# Patient Record
Sex: MALE | Race: WHITE | NOT HISPANIC OR LATINO | Employment: FULL TIME | URBAN - METROPOLITAN AREA
[De-identification: names, ages, dates, MRNs, and addresses within clinical notes are randomized per-mention and may not be internally consistent; named-entity substitution may affect disease eponyms.]

---

## 2024-05-12 DIAGNOSIS — E78.00 PURE HYPERCHOLESTEROLEMIA: ICD-10-CM

## 2024-05-12 DIAGNOSIS — I10 ESSENTIAL HYPERTENSION: ICD-10-CM

## 2024-05-14 DIAGNOSIS — Z79.01 CHRONIC ANTICOAGULATION: ICD-10-CM

## 2024-05-15 RX ORDER — ATORVASTATIN CALCIUM 40 MG/1
40 TABLET, FILM COATED ORAL
Qty: 90 TABLET | Refills: 0 | Status: SHIPPED | OUTPATIENT
Start: 2024-05-15

## 2024-06-04 DIAGNOSIS — Z79.01 CHRONIC ANTICOAGULATION: ICD-10-CM

## 2024-06-25 DIAGNOSIS — Z79.01 CHRONIC ANTICOAGULATION: ICD-10-CM

## 2024-06-25 RX ORDER — WARFARIN SODIUM 5 MG/1
TABLET ORAL
Qty: 135 TABLET | Refills: 0 | Status: SHIPPED | OUTPATIENT
Start: 2024-06-25

## 2024-08-15 DIAGNOSIS — I10 ESSENTIAL HYPERTENSION: ICD-10-CM

## 2024-08-15 RX ORDER — VERAPAMIL HYDROCHLORIDE 180 MG/1
360 TABLET, EXTENDED RELEASE ORAL DAILY
Qty: 180 TABLET | Refills: 0 | Status: SHIPPED | OUTPATIENT
Start: 2024-08-15

## 2024-08-15 NOTE — TELEPHONE ENCOUNTER
Received request via: Pharmacy    Was the patient seen in the last year in this department? Yes    Does the patient have an active prescription (recently filled or refills available) for medication(s) requested? No    [facility]    Does the patient have jail Plus and need 100-day supply? (This applies to ALL medications) Patient does not have SCP

## 2024-08-16 DIAGNOSIS — E78.00 PURE HYPERCHOLESTEROLEMIA: ICD-10-CM

## 2024-08-16 NOTE — TELEPHONE ENCOUNTER
Received request via: Patient    Was the patient seen in the last year in this department? Yes    Does the patient have an active prescription (recently filled or refills available) for medication(s) requested? No    [facility]    Does the patient have long-term Plus and need 100-day supply? (This applies to ALL medications) Patient does not have SCP

## 2024-08-17 RX ORDER — ATORVASTATIN CALCIUM 40 MG/1
40 TABLET, FILM COATED ORAL
Qty: 90 TABLET | Refills: 0 | Status: SHIPPED | OUTPATIENT
Start: 2024-08-17

## 2024-09-04 ENCOUNTER — TELEPHONE (OUTPATIENT)
Dept: HEALTH INFORMATION MANAGEMENT | Facility: OTHER | Age: 64
End: 2024-09-04

## 2024-09-22 DIAGNOSIS — Z79.01 CHRONIC ANTICOAGULATION: ICD-10-CM

## 2024-09-23 RX ORDER — WARFARIN SODIUM 5 MG/1
TABLET ORAL
Qty: 135 TABLET | Refills: 0 | Status: SHIPPED | OUTPATIENT
Start: 2024-09-23

## 2024-09-23 NOTE — TELEPHONE ENCOUNTER
Received request via: Pharmacy    Was the patient seen in the last year in this department? Yes    Does the patient have an active prescription (recently filled or refills available) for medication(s) requested? No        Does the patient have longterm Plus and need 100-day supply? (This applies to ALL medications) Patient does not have SCP

## 2024-10-31 ENCOUNTER — APPOINTMENT (OUTPATIENT)
Dept: MEDICAL GROUP | Facility: PHYSICIAN GROUP | Age: 64
End: 2024-10-31
Payer: COMMERCIAL

## 2024-10-31 VITALS
TEMPERATURE: 98 F | HEART RATE: 68 BPM | RESPIRATION RATE: 16 BRPM | SYSTOLIC BLOOD PRESSURE: 120 MMHG | OXYGEN SATURATION: 96 % | WEIGHT: 262.2 LBS | BODY MASS INDEX: 33.65 KG/M2 | HEIGHT: 74 IN | DIASTOLIC BLOOD PRESSURE: 72 MMHG

## 2024-10-31 DIAGNOSIS — Z85.46 HISTORY OF PROSTATE CANCER: ICD-10-CM

## 2024-10-31 DIAGNOSIS — R53.82 CHRONIC FATIGUE: ICD-10-CM

## 2024-10-31 DIAGNOSIS — K42.9 UMBILICAL HERNIA WITHOUT OBSTRUCTION AND WITHOUT GANGRENE: Chronic | ICD-10-CM

## 2024-10-31 DIAGNOSIS — E55.9 VITAMIN D DEFICIENCY DISEASE: Chronic | ICD-10-CM

## 2024-10-31 DIAGNOSIS — R73.03 PREDIABETES: Chronic | ICD-10-CM

## 2024-10-31 DIAGNOSIS — I82.5Y2 CHRONIC DEEP VEIN THROMBOSIS (DVT) OF PROXIMAL VEIN OF LEFT LOWER EXTREMITY (HCC): Chronic | ICD-10-CM

## 2024-10-31 DIAGNOSIS — I10 ESSENTIAL HYPERTENSION: Chronic | ICD-10-CM

## 2024-10-31 DIAGNOSIS — E78.5 DYSLIPIDEMIA: Chronic | ICD-10-CM

## 2024-10-31 DIAGNOSIS — Z12.5 SCREENING FOR MALIGNANT NEOPLASM OF PROSTATE: ICD-10-CM

## 2024-10-31 PROBLEM — M25.562 ACUTE PAIN OF LEFT KNEE: Status: RESOLVED | Noted: 2023-01-17 | Resolved: 2024-10-31

## 2024-10-31 PROBLEM — M79.89 LEFT LEG SWELLING: Status: RESOLVED | Noted: 2022-12-15 | Resolved: 2024-10-31

## 2024-10-31 PROBLEM — G89.29 CHRONIC PAIN OF LEFT ANKLE: Status: RESOLVED | Noted: 2022-03-07 | Resolved: 2024-10-31

## 2024-10-31 PROBLEM — Z87.19 HISTORY OF GASTROINTESTINAL BLEEDING: Status: RESOLVED | Noted: 2020-07-27 | Resolved: 2024-10-31

## 2024-10-31 PROBLEM — M25.572 CHRONIC PAIN OF LEFT ANKLE: Status: RESOLVED | Noted: 2022-03-07 | Resolved: 2024-10-31

## 2024-10-31 RX ORDER — VERAPAMIL HYDROCHLORIDE 180 MG/1
360 TABLET, EXTENDED RELEASE ORAL DAILY
Qty: 200 TABLET | Refills: 3 | Status: SHIPPED | OUTPATIENT
Start: 2024-10-31

## 2024-10-31 ASSESSMENT — FIBROSIS 4 INDEX: FIB4 SCORE: 1.36

## 2024-10-31 ASSESSMENT — ENCOUNTER SYMPTOMS: GENERAL WELL-BEING: FAIR

## 2024-10-31 ASSESSMENT — ACTIVITIES OF DAILY LIVING (ADL): BATHING_REQUIRES_ASSISTANCE: 0

## 2024-10-31 ASSESSMENT — PATIENT HEALTH QUESTIONNAIRE - PHQ9: CLINICAL INTERPRETATION OF PHQ2 SCORE: 0

## 2024-11-05 RX ORDER — AMLODIPINE BESYLATE 5 MG/1
5 TABLET ORAL DAILY
Qty: 90 TABLET | Refills: 3 | Status: SHIPPED | OUTPATIENT
Start: 2024-11-05

## 2024-11-09 ENCOUNTER — HOSPITAL ENCOUNTER (OUTPATIENT)
Dept: LAB | Facility: MEDICAL CENTER | Age: 64
End: 2024-11-09
Attending: INTERNAL MEDICINE
Payer: MEDICARE

## 2024-11-09 DIAGNOSIS — I82.5Y2 CHRONIC DEEP VEIN THROMBOSIS (DVT) OF PROXIMAL VEIN OF LEFT LOWER EXTREMITY (HCC): Chronic | ICD-10-CM

## 2024-11-09 DIAGNOSIS — E78.5 DYSLIPIDEMIA: Chronic | ICD-10-CM

## 2024-11-09 DIAGNOSIS — Z12.5 SCREENING FOR MALIGNANT NEOPLASM OF PROSTATE: ICD-10-CM

## 2024-11-09 DIAGNOSIS — R53.82 CHRONIC FATIGUE: ICD-10-CM

## 2024-11-09 DIAGNOSIS — E55.9 VITAMIN D DEFICIENCY DISEASE: Chronic | ICD-10-CM

## 2024-11-09 DIAGNOSIS — R73.03 PREDIABETES: Chronic | ICD-10-CM

## 2024-11-09 LAB
25(OH)D3 SERPL-MCNC: 34 NG/ML (ref 30–100)
ALT SERPL-CCNC: 51 U/L (ref 2–50)
ANION GAP SERPL CALC-SCNC: 11 MMOL/L (ref 7–16)
BASOPHILS # BLD AUTO: 1 % (ref 0–1.8)
BASOPHILS # BLD: 0.06 K/UL (ref 0–0.12)
BUN SERPL-MCNC: 16 MG/DL (ref 8–22)
CALCIUM SERPL-MCNC: 9.9 MG/DL (ref 8.5–10.5)
CHLORIDE SERPL-SCNC: 106 MMOL/L (ref 96–112)
CHOLEST SERPL-MCNC: 129 MG/DL (ref 100–199)
CO2 SERPL-SCNC: 24 MMOL/L (ref 20–33)
CREAT SERPL-MCNC: 0.78 MG/DL (ref 0.5–1.4)
CREAT UR-MCNC: 152.67 MG/DL
EOSINOPHIL # BLD AUTO: 0.15 K/UL (ref 0–0.51)
EOSINOPHIL NFR BLD: 2.5 % (ref 0–6.9)
ERYTHROCYTE [DISTWIDTH] IN BLOOD BY AUTOMATED COUNT: 45.4 FL (ref 35.9–50)
EST. AVERAGE GLUCOSE BLD GHB EST-MCNC: 117 MG/DL
FASTING STATUS PATIENT QL REPORTED: NORMAL
GFR SERPLBLD CREATININE-BSD FMLA CKD-EPI: 99 ML/MIN/1.73 M 2
GLUCOSE SERPL-MCNC: 86 MG/DL (ref 65–99)
HBA1C MFR BLD: 5.7 % (ref 4–5.6)
HCT VFR BLD AUTO: 47.1 % (ref 42–52)
HDLC SERPL-MCNC: 48 MG/DL
HGB BLD-MCNC: 16 G/DL (ref 14–18)
IMM GRANULOCYTES # BLD AUTO: 0.03 K/UL (ref 0–0.11)
IMM GRANULOCYTES NFR BLD AUTO: 0.5 % (ref 0–0.9)
LDLC SERPL CALC-MCNC: 62 MG/DL
LYMPHOCYTES # BLD AUTO: 1.38 K/UL (ref 1–4.8)
LYMPHOCYTES NFR BLD: 22.6 % (ref 22–41)
MCH RBC QN AUTO: 32.9 PG (ref 27–33)
MCHC RBC AUTO-ENTMCNC: 34 G/DL (ref 32.3–36.5)
MCV RBC AUTO: 96.9 FL (ref 81.4–97.8)
MICROALBUMIN UR-MCNC: 1.5 MG/DL
MICROALBUMIN/CREAT UR: 10 MG/G (ref 0–30)
MONOCYTES # BLD AUTO: 0.64 K/UL (ref 0–0.85)
MONOCYTES NFR BLD AUTO: 10.5 % (ref 0–13.4)
NEUTROPHILS # BLD AUTO: 3.85 K/UL (ref 1.82–7.42)
NEUTROPHILS NFR BLD: 62.9 % (ref 44–72)
NRBC # BLD AUTO: 0 K/UL
NRBC BLD-RTO: 0 /100 WBC (ref 0–0.2)
PLATELET # BLD AUTO: 224 K/UL (ref 164–446)
PMV BLD AUTO: 10.3 FL (ref 9–12.9)
POTASSIUM SERPL-SCNC: 4.6 MMOL/L (ref 3.6–5.5)
PSA SERPL DL<=0.01 NG/ML-MCNC: 0.45 NG/ML (ref 0–4)
RBC # BLD AUTO: 4.86 M/UL (ref 4.7–6.1)
SODIUM SERPL-SCNC: 141 MMOL/L (ref 135–145)
TRIGL SERPL-MCNC: 95 MG/DL (ref 0–149)
TSH SERPL-ACNC: 1.68 UIU/ML (ref 0.35–5.5)
WBC # BLD AUTO: 6.1 K/UL (ref 4.8–10.8)

## 2024-11-09 PROCEDURE — 84460 ALANINE AMINO (ALT) (SGPT): CPT

## 2024-11-09 PROCEDURE — 80048 BASIC METABOLIC PNL TOTAL CA: CPT

## 2024-11-09 PROCEDURE — 80061 LIPID PANEL: CPT

## 2024-11-09 PROCEDURE — 84153 ASSAY OF PSA TOTAL: CPT

## 2024-11-09 PROCEDURE — 36415 COLL VENOUS BLD VENIPUNCTURE: CPT

## 2024-11-09 PROCEDURE — 84443 ASSAY THYROID STIM HORMONE: CPT

## 2024-11-09 PROCEDURE — 83036 HEMOGLOBIN GLYCOSYLATED A1C: CPT

## 2024-11-09 PROCEDURE — 82306 VITAMIN D 25 HYDROXY: CPT

## 2024-11-09 PROCEDURE — 82043 UR ALBUMIN QUANTITATIVE: CPT

## 2024-11-09 PROCEDURE — 82570 ASSAY OF URINE CREATININE: CPT

## 2024-11-09 PROCEDURE — 85025 COMPLETE CBC W/AUTO DIFF WBC: CPT

## 2024-11-15 DIAGNOSIS — E78.00 PURE HYPERCHOLESTEROLEMIA: ICD-10-CM

## 2024-11-15 RX ORDER — ATORVASTATIN CALCIUM 40 MG/1
40 TABLET, FILM COATED ORAL
Qty: 100 TABLET | Refills: 1 | Status: SHIPPED | OUTPATIENT
Start: 2024-11-15

## 2024-11-15 NOTE — TELEPHONE ENCOUNTER
Pt has had OV within the 12 month protocol and lipid panel is current. 6 month supply sent to pharmacy.   Lab Results   Component Value Date/Time    CHOLSTRLTOT 129 11/09/2024 07:25 AM    LDL 62 11/09/2024 07:25 AM    HDL 48 11/09/2024 07:25 AM    TRIGLYCERIDE 95 11/09/2024 07:25 AM       Lab Results   Component Value Date/Time    SODIUM 141 11/09/2024 07:25 AM    POTASSIUM 4.6 11/09/2024 07:25 AM    CHLORIDE 106 11/09/2024 07:25 AM    CO2 24 11/09/2024 07:25 AM    GLUCOSE 86 11/09/2024 07:25 AM    BUN 16 11/09/2024 07:25 AM    CREATININE 0.78 11/09/2024 07:25 AM     Lab Results   Component Value Date/Time    ALKPHOSPHAT 146 (H) 11/15/2022 03:16 PM    ASTSGOT 28 11/15/2022 03:16 PM    ALTSGPT 51 (H) 11/09/2024 07:25 AM    TBILIRUBIN 0.6 11/15/2022 03:16 PM        Alem Nieves, Clinical Pharmacist, CDE, CACP  Managed Care Pharmacist for [facility] and [facility]

## 2024-11-25 ENCOUNTER — OFFICE VISIT (OUTPATIENT)
Dept: SURGICAL ONCOLOGY | Facility: MEDICAL CENTER | Age: 64
End: 2024-11-25
Payer: MEDICARE

## 2024-11-25 VITALS
OXYGEN SATURATION: 96 % | HEART RATE: 72 BPM | SYSTOLIC BLOOD PRESSURE: 142 MMHG | HEIGHT: 74 IN | WEIGHT: 269 LBS | TEMPERATURE: 97.3 F | DIASTOLIC BLOOD PRESSURE: 82 MMHG | BODY MASS INDEX: 34.52 KG/M2

## 2024-11-25 DIAGNOSIS — K42.9 UMBILICAL HERNIA WITHOUT OBSTRUCTION AND WITHOUT GANGRENE: ICD-10-CM

## 2024-11-25 PROCEDURE — 99213 OFFICE O/P EST LOW 20 MIN: CPT | Performed by: SURGERY

## 2024-11-25 PROCEDURE — 3077F SYST BP >= 140 MM HG: CPT | Performed by: SURGERY

## 2024-11-25 PROCEDURE — 3079F DIAST BP 80-89 MM HG: CPT | Performed by: SURGERY

## 2024-11-25 NOTE — PROGRESS NOTES
DEPARTMENT OF SURGERY  BARIATRIC AND GASTROESOPHAGEAL SURGERY       DATE OF CLINIC VISIT:  11/25/2024      CHIEF COMPLAINT:  Symptomatic umbilical bulge, associated with pain/discomfort    HISTORY OF PRESENT ILLNESS:  We had the pleasure of seeing FRANKLIN MALDONADO, a 64 y.o. male, for evaluation of a moderate umbilical / abdominal bulge, associated with pain/discomfort. This consultation was requested by Dr. Fisher. Patient reports that his symptoms are more prominent when lifting, coughing, performing strenuous activities, and getting up from standing. This bulge is reducible, getting larger, and is limiting exercise and some activities of daily living. No history of intestinal obstruction (bloating/distention, nausea/vomiting, worsening abdominal pain, or obstipation) or any episodes of this bulge being irreducible. Initial onset of symptoms started many years ago, but it has increased in size and became more painful in the last few years. No other abdominal or inguinal bulges and no other personal or family history of hernias. Pertinent imaging includes: NO Prior imaging has been done.     Of note, patient is on Xarelto for history of DVT's and strokes.    Allergies   Allergen Reactions    Enalapril Cough       Past Medical History:   Diagnosis Date    Blister of foot or toe, infected 9/23/2015    Cancer (HCC) 2011    prostate cancer    Heart burn     Hyperlipidemia     Hypertension     Personal history of venous thrombosis and embolism 2000, 2005    leg    Prostate cancer (HCC) 5/16/2013    Sees Dr Washington  Treated with external beam radiation IMO load March 2020    Snoring     Umbilicus discharge 9/23/2015    Unspecified hemorrhagic conditions     due to coumadin      Past Surgical History:   Procedure Laterality Date    [place] COLONOSCOPY,DIAGNOSTIC N/A 7/28/2020    Procedure: COLONOSCOPY;  Surgeon: Blayne Guzman M.D.;  Location: SURGERY SAME DAY [facility];  Service: Gastroenterology    [place] UPPER GI  ENDOSCOPY,BIOPSY N/A 7/28/2020    Procedure: GASTROSCOPY, WITH BIOPSY;  Surgeon: Blayne Guzman M.D.;  Location: SURGERY SAME DAY [facility];  Service: Gastroenterology    COLONOSCOPY WITH APC N/A 8/20/2018    Procedure: COLONOSCOPY WITH APC;  Surgeon: Maurilio Escoto M.D.;  Location: ENDOSCOPY [facility];  Service: Gastroenterology    COLONOSCOPY  3/2011    Mild diverticulosis;hemorrhoids   Dr Escoto    KNEE ARTHROSCOPY  10/25/2010    Right knee  Performed by RADHA RAJAN at SURGERY [facility]    MENISCECTOMY, KNEE, MEDIAL  10/25/2010    Right knee Performed by RADHA RAJAN at SURGERY [facility]    SYNOVECTOMY  10/25/2010    Right knee Performed by RADHA RAJAN at SURGERY [facility]    ORIF, ANKLE  2005    left ankle    ORIF, FRACTURE, FEMUR  2005    left femur     Family History   Problem Relation Age of Onset    Lung Disease Mother         emphysema    Cancer Father         lung    Lung Disease Father     Diabetes Other     Heart Disease Other     Diabetes Sister     No Known Problems Brother     No Known Problems Sister     No Known Problems Daughter     No Known Problems Daughter      Social History     Socioeconomic History    Marital status:      Spouse name: Not on file    Number of children: Not on file    Years of education: Not on file    Highest education level: Not on file   Occupational History    Not on file   Tobacco Use    Smoking status: Never    Smokeless tobacco: Never   Vaping Use    Vaping status: Never Used   Substance and Sexual Activity    Alcohol use: Yes     Alcohol/week: 1.2 oz     Types: 2 Cans of beer per week     Comment: drinks beer on weekends, 1-2 beers on the weekdays    Drug use: No    Sexual activity: Yes     Partners: Female   Other Topics Concern    Not on file   Social History Narrative    Not on file     Social Drivers of Health     Financial Resource Strain: Not on file   Food Insecurity: Not on file   Transportation Needs: Not  "on file   Physical Activity: Not on file   Stress: Not on file   Social Connections: Not on file   Intimate Partner Violence: Not on file   Housing Stability: Not on file       Current Outpatient Medications   Medication Sig Dispense Refill    atorvastatin (LIPITOR) 40 MG Tab Take 1 Tablet by mouth at bedtime. 100 Tablet 1    amLODIPine (NORVASC) 5 MG Tab Take 1 Tablet by mouth every day. 90 Tablet 3    rivaroxaban (XARELTO) 20 MG Tab tablet Take 1 Tablet by mouth with dinner. 30 Tablet 6    rivaroxaban (XARELTO) 15 MG Tab tablet Take 1 Tablet by mouth every 12 hours. 42 Tablet 0    vitamin D3 (CHOLECALCIFEROL) 1000 Unit (25 mcg) Tab Take 1 Tablet by mouth every day.      acetaminophen (TYLENOL) 500 MG Tab Take 2 Tablets by mouth every 6 hours as needed for Moderate Pain.      MAGNESIUM PO Take 1 Tablet by mouth every day.      therapeutic multivitamin-minerals (THERAGRAN-M) Tab Take 1 Tablet by mouth every day.       No current facility-administered medications for this visit.      REVIEW OF SYSTEMS:   Review of systems (+10 systems) unremarkable except for concerns noted by patient or items listed. Please see HPI for additional ROS.    PHYSICAL EXAM:  BP (!) 142/82 (BP Location: Left arm, Patient Position: Sitting)   Pulse 72   Temp 36.3 °C (97.3 °F) (Temporal)   Ht 1.88 m (6' 2\")   Wt 122 kg (269 lb)   SpO2 96%   BMI 34.54 kg/m²     Wt Readings from Last 1 Encounters:   11/25/24 122 kg (269 lb)     Constitutional:  well developed, well nourished, in no apparent distress, alert and oriented x 3  Eyes:   sclera is anicteric, PERRLA  Head:   normocephalic, atraumatic, no scalp lesions  Mouth/Throat:  normal, moist mucous membranes  Neck:   supple, no JVD, no tracheal deviation  Chest:   good respiratory effort, no accessory muscle use  Cardiac:  normal rate, regular rhythm   Abdomen:  + moderate, reducible umbilical bulge, with fascial defect measuring ~ 3 cm in diameter, + min tenderness to palpation, + min " overlying cutaneous erythema (no ulcerations), no associated distention, no rebound/guarding, no TTP elsewhere, no other bulges or masses   Groin:     R - no bulges/masses, no palpable cough impulse, no overlying skin changes, no TTP     L - no bulges/masses, no palpable cough impulse, no overlying skin changes, no TTP  Extremities:  normal strength, tone, and muscle mass; no clubbing, cyanosis, or edema  bilaterally  Neurologic:  normal without focal findings, cognition is intact, CN II - XII are grossly intact  Skin:   no jaundice, no rashes or significant lesions, no bruising  Psych:   good judgement, mood and affect are appropriate, excellent hygiene    DATA REVIEW:    None    ASSESSMENT AND PLAN:  Mr. FRANKLIN MALDONADO is a 64 y.o. male, who complains of a moderate-size, umbilical bulge, associated with pain/discomfort, and is found to have a reducible, umbilical / abdominal wall hernia (~ 3 cm) on clinical exam. I have discussed robotic, laparoscopic, and open umbilical / abdominal wall hernia repair procedures, possibly with synthetic mesh reinforcement.     We discussed the risks of repair and the risk it will enlarge, worsen, or incarcerate without repair. In addition, we discussed alternatives, risks and disability in the jacquelyn-operative setting. Specifically, discussed were the risks of bleeding, infection, pain, fistulization, bowel or other organ injury, complications due to mesh and chances of recurrence. Questions were answered in full and patient wishes to proceed. Appropriate educational material was presented to the patient. Robotic, possible laparoscopic, umbilical hernia repair with possible mesh placement will be scheduled at his earliest convenience.     Of note, patient was instructed to hold his Xarelto for 24 hours prior to his abdominal surgery.    Thank you for giving us the opportunity to care for your patient.      Sincerely,    Pineda Fox MD MPH FACS [facility]  Bariatric and  Gastroesophageal Surgery  Department of Surgery, [facility]  Clinical Assistant Professor of Surgery  [employer]    11/25/2024  +3

## 2024-12-03 ENCOUNTER — APPOINTMENT (OUTPATIENT)
Dept: ADMISSIONS | Facility: MEDICAL CENTER | Age: 64
End: 2024-12-03
Attending: SURGERY
Payer: MEDICARE

## 2024-12-04 ENCOUNTER — DOCUMENTATION (OUTPATIENT)
Dept: HEALTH INFORMATION MANAGEMENT | Facility: OTHER | Age: 64
End: 2024-12-04
Payer: MEDICARE

## 2024-12-09 ENCOUNTER — PRE-ADMISSION TESTING (OUTPATIENT)
Dept: ADMISSIONS | Facility: MEDICAL CENTER | Age: 64
End: 2024-12-09
Attending: SURGERY
Payer: MEDICARE

## 2024-12-09 NOTE — PREADMIT AVS NOTE
Current Medications   Medication Instructions    atorvastatin (LIPITOR) 40 MG Tab Continue taking medication prior to surgery    amLODIPine (NORVASC) 5 MG Tab Continue taking medication as prescribed, including morning of procedure     rivaroxaban (XARELTO) 20 MG Tab tablet Follow instructions from Surgeon or Specialist     rivaroxaban (XARELTO) 15 MG Tab tablet NOT TAKING    vitamin D3 (CHOLECALCIFEROL) 1000 Unit (25 mcg) Tab Stop 7 days before surgery    acetaminophen (TYLENOL) 500 MG Tab As needed medication, may take if needed, including morning of procedure     MAGNESIUM PO Stop 7 days before surgery    therapeutic multivitamin-minerals (THERAGRAN-M) Tab Stop 7 days before surgery

## 2024-12-16 ENCOUNTER — PRE-ADMISSION TESTING (OUTPATIENT)
Dept: ADMISSIONS | Facility: MEDICAL CENTER | Age: 64
End: 2024-12-16
Attending: SURGERY
Payer: MEDICARE

## 2024-12-16 DIAGNOSIS — Z01.810 PRE-OPERATIVE CARDIOVASCULAR EXAMINATION: ICD-10-CM

## 2024-12-16 LAB — EKG IMPRESSION: NORMAL

## 2024-12-16 PROCEDURE — 93010 ELECTROCARDIOGRAM REPORT: CPT | Performed by: INTERNAL MEDICINE

## 2024-12-16 PROCEDURE — 93005 ELECTROCARDIOGRAM TRACING: CPT | Mod: TC

## 2024-12-27 ENCOUNTER — ANESTHESIA (OUTPATIENT)
Dept: SURGERY | Facility: MEDICAL CENTER | Age: 64
End: 2024-12-27
Payer: MEDICARE

## 2024-12-27 ENCOUNTER — ANESTHESIA EVENT (OUTPATIENT)
Dept: SURGERY | Facility: MEDICAL CENTER | Age: 64
End: 2024-12-27
Payer: MEDICARE

## 2024-12-27 ENCOUNTER — PHARMACY VISIT (OUTPATIENT)
Dept: PHARMACY | Facility: MEDICAL CENTER | Age: 64
End: 2024-12-27
Payer: COMMERCIAL

## 2024-12-27 ENCOUNTER — HOSPITAL ENCOUNTER (OUTPATIENT)
Facility: MEDICAL CENTER | Age: 64
End: 2024-12-27
Attending: SURGERY | Admitting: SURGERY
Payer: MEDICARE

## 2024-12-27 VITALS
DIASTOLIC BLOOD PRESSURE: 58 MMHG | WEIGHT: 264.11 LBS | HEART RATE: 86 BPM | TEMPERATURE: 96.6 F | RESPIRATION RATE: 17 BRPM | HEIGHT: 72 IN | BODY MASS INDEX: 35.77 KG/M2 | OXYGEN SATURATION: 92 % | SYSTOLIC BLOOD PRESSURE: 121 MMHG

## 2024-12-27 DIAGNOSIS — R10.33 UMBILICAL PAIN: ICD-10-CM

## 2024-12-27 PROCEDURE — 700102 HCHG RX REV CODE 250 W/ 637 OVERRIDE(OP): Performed by: ANESTHESIOLOGY

## 2024-12-27 PROCEDURE — 160031 HCHG SURGERY MINUTES - 1ST 30 MINS LEVEL 5: Performed by: SURGERY

## 2024-12-27 PROCEDURE — 160002 HCHG RECOVERY MINUTES (STAT): Performed by: SURGERY

## 2024-12-27 PROCEDURE — 160046 HCHG PACU - 1ST 60 MINS PHASE II: Performed by: SURGERY

## 2024-12-27 PROCEDURE — 160025 RECOVERY II MINUTES (STATS): Performed by: SURGERY

## 2024-12-27 PROCEDURE — 700111 HCHG RX REV CODE 636 W/ 250 OVERRIDE (IP): Performed by: ANESTHESIOLOGY

## 2024-12-27 PROCEDURE — 700111 HCHG RX REV CODE 636 W/ 250 OVERRIDE (IP): Performed by: SURGERY

## 2024-12-27 PROCEDURE — C1781 MESH (IMPLANTABLE): HCPCS | Performed by: SURGERY

## 2024-12-27 PROCEDURE — 49593 RPR AA HRN 1ST 3-10 RDC: CPT | Performed by: SURGERY

## 2024-12-27 PROCEDURE — RXMED WILLOW AMBULATORY MEDICATION CHARGE: Performed by: SURGERY

## 2024-12-27 PROCEDURE — 160035 HCHG PACU - 1ST 60 MINS PHASE I: Performed by: SURGERY

## 2024-12-27 PROCEDURE — 160048 HCHG OR STATISTICAL LEVEL 1-5: Performed by: SURGERY

## 2024-12-27 PROCEDURE — 700105 HCHG RX REV CODE 258: Performed by: SURGERY

## 2024-12-27 PROCEDURE — 700101 HCHG RX REV CODE 250: Performed by: SURGERY

## 2024-12-27 PROCEDURE — A9270 NON-COVERED ITEM OR SERVICE: HCPCS | Performed by: ANESTHESIOLOGY

## 2024-12-27 PROCEDURE — S2900 ROBOTIC SURGICAL SYSTEM: HCPCS | Performed by: SURGERY

## 2024-12-27 PROCEDURE — 160047 HCHG PACU  - EA ADDL 30 MINS PHASE II: Performed by: SURGERY

## 2024-12-27 PROCEDURE — 160036 HCHG PACU - EA ADDL 30 MINS PHASE I: Performed by: SURGERY

## 2024-12-27 PROCEDURE — 160009 HCHG ANES TIME/MIN: Performed by: SURGERY

## 2024-12-27 PROCEDURE — 502714 HCHG ROBOTIC SURGERY SERVICES: Performed by: SURGERY

## 2024-12-27 PROCEDURE — 700101 HCHG RX REV CODE 250: Performed by: ANESTHESIOLOGY

## 2024-12-27 PROCEDURE — 160042 HCHG SURGERY MINUTES - EA ADDL 1 MIN LEVEL 5: Performed by: SURGERY

## 2024-12-27 DEVICE — IMPLANTABLE DEVICE: Type: IMPLANTABLE DEVICE | Site: ABDOMEN | Status: FUNCTIONAL

## 2024-12-27 RX ORDER — DIPHENHYDRAMINE HYDROCHLORIDE 50 MG/ML
12.5 INJECTION INTRAMUSCULAR; INTRAVENOUS
Status: DISCONTINUED | OUTPATIENT
Start: 2024-12-27 | End: 2024-12-27 | Stop reason: HOSPADM

## 2024-12-27 RX ORDER — METHOCARBAMOL 500 MG/1
1000 TABLET, FILM COATED ORAL 3 TIMES DAILY PRN
Qty: 180 TABLET | Refills: 1 | Status: SHIPPED | OUTPATIENT
Start: 2024-12-27

## 2024-12-27 RX ORDER — MEPERIDINE HYDROCHLORIDE 25 MG/ML
6.25 INJECTION INTRAMUSCULAR; INTRAVENOUS; SUBCUTANEOUS
Status: DISCONTINUED | OUTPATIENT
Start: 2024-12-27 | End: 2024-12-27 | Stop reason: HOSPADM

## 2024-12-27 RX ORDER — HYDRALAZINE HYDROCHLORIDE 20 MG/ML
5 INJECTION INTRAMUSCULAR; INTRAVENOUS
Status: DISCONTINUED | OUTPATIENT
Start: 2024-12-27 | End: 2024-12-27 | Stop reason: HOSPADM

## 2024-12-27 RX ORDER — BUPIVACAINE HYDROCHLORIDE AND EPINEPHRINE 5; 5 MG/ML; UG/ML
INJECTION, SOLUTION PERINEURAL
Status: DISCONTINUED | OUTPATIENT
Start: 2024-12-27 | End: 2024-12-27 | Stop reason: HOSPADM

## 2024-12-27 RX ORDER — SODIUM CHLORIDE, SODIUM LACTATE, POTASSIUM CHLORIDE, CALCIUM CHLORIDE 600; 310; 30; 20 MG/100ML; MG/100ML; MG/100ML; MG/100ML
INJECTION, SOLUTION INTRAVENOUS CONTINUOUS
Status: ACTIVE | OUTPATIENT
Start: 2024-12-27 | End: 2024-12-27

## 2024-12-27 RX ORDER — ALBUTEROL SULFATE 5 MG/ML
2.5 SOLUTION RESPIRATORY (INHALATION)
Status: DISCONTINUED | OUTPATIENT
Start: 2024-12-27 | End: 2024-12-27 | Stop reason: HOSPADM

## 2024-12-27 RX ORDER — OXYCODONE HCL 5 MG/5 ML
5 SOLUTION, ORAL ORAL
Status: COMPLETED | OUTPATIENT
Start: 2024-12-27 | End: 2024-12-27

## 2024-12-27 RX ORDER — LIDOCAINE HYDROCHLORIDE 20 MG/ML
INJECTION, SOLUTION EPIDURAL; INFILTRATION; INTRACAUDAL; PERINEURAL PRN
Status: DISCONTINUED | OUTPATIENT
Start: 2024-12-27 | End: 2024-12-27 | Stop reason: SURG

## 2024-12-27 RX ORDER — HEPARIN SODIUM 5000 [USP'U]/ML
5000 INJECTION, SOLUTION INTRAVENOUS; SUBCUTANEOUS
Status: COMPLETED | OUTPATIENT
Start: 2024-12-27 | End: 2024-12-27

## 2024-12-27 RX ORDER — HYDROMORPHONE HYDROCHLORIDE 1 MG/ML
0.4 INJECTION, SOLUTION INTRAMUSCULAR; INTRAVENOUS; SUBCUTANEOUS
Status: DISCONTINUED | OUTPATIENT
Start: 2024-12-27 | End: 2024-12-27 | Stop reason: HOSPADM

## 2024-12-27 RX ORDER — OXYCODONE AND ACETAMINOPHEN 5; 325 MG/1; MG/1
1 TABLET ORAL EVERY 4 HOURS PRN
Qty: 40 TABLET | Refills: 0 | Status: SHIPPED | OUTPATIENT
Start: 2024-12-27 | End: 2025-01-03

## 2024-12-27 RX ORDER — SODIUM CHLORIDE, SODIUM LACTATE, POTASSIUM CHLORIDE, CALCIUM CHLORIDE 600; 310; 30; 20 MG/100ML; MG/100ML; MG/100ML; MG/100ML
INJECTION, SOLUTION INTRAVENOUS CONTINUOUS
Status: DISCONTINUED | OUTPATIENT
Start: 2024-12-27 | End: 2024-12-27 | Stop reason: HOSPADM

## 2024-12-27 RX ORDER — POLYETHYLENE GLYCOL 3350 17 G/17G
17 POWDER, FOR SOLUTION ORAL
Qty: 14 EACH | Refills: 0 | Status: SHIPPED | OUTPATIENT
Start: 2024-12-27 | End: 2025-01-10

## 2024-12-27 RX ORDER — CEFAZOLIN SODIUM 1 G/3ML
INJECTION, POWDER, FOR SOLUTION INTRAMUSCULAR; INTRAVENOUS PRN
Status: DISCONTINUED | OUTPATIENT
Start: 2024-12-27 | End: 2024-12-27 | Stop reason: SURG

## 2024-12-27 RX ORDER — NAPROXEN 500 MG/1
500 TABLET ORAL
Qty: 60 TABLET | Refills: 1 | Status: SHIPPED | OUTPATIENT
Start: 2024-12-27 | End: 2025-01-26

## 2024-12-27 RX ORDER — OXYCODONE HCL 5 MG/5 ML
10 SOLUTION, ORAL ORAL
Status: DISCONTINUED | OUTPATIENT
Start: 2024-12-27 | End: 2024-12-27 | Stop reason: HOSPADM

## 2024-12-27 RX ORDER — METOPROLOL TARTRATE 1 MG/ML
1 INJECTION, SOLUTION INTRAVENOUS
Status: DISCONTINUED | OUTPATIENT
Start: 2024-12-27 | End: 2024-12-27 | Stop reason: HOSPADM

## 2024-12-27 RX ORDER — EPHEDRINE SULFATE 50 MG/ML
5 INJECTION, SOLUTION INTRAVENOUS
Status: DISCONTINUED | OUTPATIENT
Start: 2024-12-27 | End: 2024-12-27 | Stop reason: HOSPADM

## 2024-12-27 RX ORDER — DEXAMETHASONE SODIUM PHOSPHATE 4 MG/ML
INJECTION, SOLUTION INTRA-ARTICULAR; INTRALESIONAL; INTRAMUSCULAR; INTRAVENOUS; SOFT TISSUE PRN
Status: DISCONTINUED | OUTPATIENT
Start: 2024-12-27 | End: 2024-12-27 | Stop reason: SURG

## 2024-12-27 RX ORDER — MIDAZOLAM HYDROCHLORIDE 1 MG/ML
INJECTION INTRAMUSCULAR; INTRAVENOUS PRN
Status: DISCONTINUED | OUTPATIENT
Start: 2024-12-27 | End: 2024-12-27 | Stop reason: SURG

## 2024-12-27 RX ORDER — METHOCARBAMOL 100 MG/ML
1000 INJECTION, SOLUTION INTRAMUSCULAR; INTRAVENOUS ONCE
Status: COMPLETED | OUTPATIENT
Start: 2024-12-27 | End: 2024-12-27

## 2024-12-27 RX ORDER — HYDROMORPHONE HYDROCHLORIDE 1 MG/ML
0.1 INJECTION, SOLUTION INTRAMUSCULAR; INTRAVENOUS; SUBCUTANEOUS
Status: DISCONTINUED | OUTPATIENT
Start: 2024-12-27 | End: 2024-12-27 | Stop reason: HOSPADM

## 2024-12-27 RX ORDER — KETOROLAC TROMETHAMINE 15 MG/ML
15 INJECTION, SOLUTION INTRAMUSCULAR; INTRAVENOUS ONCE
Status: COMPLETED | OUTPATIENT
Start: 2024-12-27 | End: 2024-12-27

## 2024-12-27 RX ORDER — HYDRALAZINE HYDROCHLORIDE 20 MG/ML
INJECTION INTRAMUSCULAR; INTRAVENOUS PRN
Status: DISCONTINUED | OUTPATIENT
Start: 2024-12-27 | End: 2024-12-27 | Stop reason: SURG

## 2024-12-27 RX ORDER — ROCURONIUM BROMIDE 10 MG/ML
INJECTION, SOLUTION INTRAVENOUS PRN
Status: DISCONTINUED | OUTPATIENT
Start: 2024-12-27 | End: 2024-12-27 | Stop reason: SURG

## 2024-12-27 RX ORDER — HYDROMORPHONE HYDROCHLORIDE 1 MG/ML
0.2 INJECTION, SOLUTION INTRAMUSCULAR; INTRAVENOUS; SUBCUTANEOUS
Status: DISCONTINUED | OUTPATIENT
Start: 2024-12-27 | End: 2024-12-27 | Stop reason: HOSPADM

## 2024-12-27 RX ORDER — OXYCODONE HCL 5 MG/5 ML
5 SOLUTION, ORAL ORAL
Status: DISCONTINUED | OUTPATIENT
Start: 2024-12-27 | End: 2024-12-27 | Stop reason: HOSPADM

## 2024-12-27 RX ORDER — METHOCARBAMOL 500 MG/1
1000 TABLET, FILM COATED ORAL 3 TIMES DAILY PRN
Qty: 90 TABLET | Refills: 1 | Status: SHIPPED | OUTPATIENT
Start: 2024-12-27

## 2024-12-27 RX ORDER — ONDANSETRON 2 MG/ML
4 INJECTION INTRAMUSCULAR; INTRAVENOUS
Status: DISCONTINUED | OUTPATIENT
Start: 2024-12-27 | End: 2024-12-27 | Stop reason: HOSPADM

## 2024-12-27 RX ORDER — HALOPERIDOL 5 MG/ML
1 INJECTION INTRAMUSCULAR
Status: DISCONTINUED | OUTPATIENT
Start: 2024-12-27 | End: 2024-12-27 | Stop reason: HOSPADM

## 2024-12-27 RX ORDER — ACETAMINOPHEN 500 MG
1000 TABLET ORAL ONCE
Status: COMPLETED | OUTPATIENT
Start: 2024-12-27 | End: 2024-12-27

## 2024-12-27 RX ORDER — OXYCODONE HCL 5 MG/5 ML
10 SOLUTION, ORAL ORAL
Status: COMPLETED | OUTPATIENT
Start: 2024-12-27 | End: 2024-12-27

## 2024-12-27 RX ADMIN — KETOROLAC TROMETHAMINE 15 MG: 15 INJECTION, SOLUTION INTRAMUSCULAR; INTRAVENOUS at 12:57

## 2024-12-27 RX ADMIN — SUGAMMADEX 200 MG: 100 INJECTION, SOLUTION INTRAVENOUS at 12:24

## 2024-12-27 RX ADMIN — LIDOCAINE HYDROCHLORIDE 50 MG: 20 INJECTION, SOLUTION EPIDURAL; INFILTRATION; INTRACAUDAL; PERINEURAL at 11:54

## 2024-12-27 RX ADMIN — ROCURONIUM BROMIDE 12 MG: 50 INJECTION, SOLUTION INTRAVENOUS at 11:35

## 2024-12-27 RX ADMIN — SODIUM CHLORIDE, SODIUM LACTATE, POTASSIUM CHLORIDE, AND CALCIUM CHLORIDE: .6; .31; .03; .02 INJECTION, SOLUTION INTRAVENOUS at 09:10

## 2024-12-27 RX ADMIN — ROCURONIUM BROMIDE 10 MG: 50 INJECTION, SOLUTION INTRAVENOUS at 11:54

## 2024-12-27 RX ADMIN — FENTANYL CITRATE 28 MCG: 50 INJECTION, SOLUTION INTRAMUSCULAR; INTRAVENOUS at 12:32

## 2024-12-27 RX ADMIN — OXYCODONE HYDROCHLORIDE 10 MG: 5 SOLUTION ORAL at 12:38

## 2024-12-27 RX ADMIN — FENTANYL CITRATE 50 MCG: 50 INJECTION, SOLUTION INTRAMUSCULAR; INTRAVENOUS at 12:37

## 2024-12-27 RX ADMIN — CEFAZOLIN 3 G: 1 INJECTION, POWDER, FOR SOLUTION INTRAMUSCULAR; INTRAVENOUS at 11:05

## 2024-12-27 RX ADMIN — FENTANYL CITRATE 50 MCG: 50 INJECTION, SOLUTION INTRAMUSCULAR; INTRAVENOUS at 11:35

## 2024-12-27 RX ADMIN — HYDROMORPHONE HYDROCHLORIDE 0.4 MG: 1 INJECTION, SOLUTION INTRAMUSCULAR; INTRAVENOUS; SUBCUTANEOUS at 13:09

## 2024-12-27 RX ADMIN — PROPOFOL 200 MG: 10 INJECTION, EMULSION INTRAVENOUS at 10:58

## 2024-12-27 RX ADMIN — DEXAMETHASONE SODIUM PHOSPHATE 8 MG: 4 INJECTION INTRA-ARTICULAR; INTRALESIONAL; INTRAMUSCULAR; INTRAVENOUS; SOFT TISSUE at 11:37

## 2024-12-27 RX ADMIN — FENTANYL CITRATE 50 MCG: 50 INJECTION, SOLUTION INTRAMUSCULAR; INTRAVENOUS at 11:12

## 2024-12-27 RX ADMIN — FENTANYL CITRATE 75 MCG: 50 INJECTION, SOLUTION INTRAMUSCULAR; INTRAVENOUS at 11:21

## 2024-12-27 RX ADMIN — HYDROMORPHONE HYDROCHLORIDE 0.2 MG: 1 INJECTION, SOLUTION INTRAMUSCULAR; INTRAVENOUS; SUBCUTANEOUS at 13:29

## 2024-12-27 RX ADMIN — FENTANYL CITRATE 50 MCG: 50 INJECTION, SOLUTION INTRAMUSCULAR; INTRAVENOUS at 11:52

## 2024-12-27 RX ADMIN — HYDRALAZINE HYDROCHLORIDE 5 MG: 20 INJECTION, SOLUTION INTRAMUSCULAR; INTRAVENOUS at 12:01

## 2024-12-27 RX ADMIN — HYDRALAZINE HYDROCHLORIDE 5 MG: 20 INJECTION, SOLUTION INTRAMUSCULAR; INTRAVENOUS at 11:34

## 2024-12-27 RX ADMIN — LIDOCAINE HYDROCHLORIDE 50 MG: 20 INJECTION, SOLUTION EPIDURAL; INFILTRATION; INTRACAUDAL; PERINEURAL at 10:58

## 2024-12-27 RX ADMIN — HYDROMORPHONE HYDROCHLORIDE 0.4 MG: 1 INJECTION, SOLUTION INTRAMUSCULAR; INTRAVENOUS; SUBCUTANEOUS at 13:23

## 2024-12-27 RX ADMIN — ROCURONIUM BROMIDE 63 MG: 50 INJECTION, SOLUTION INTRAVENOUS at 10:58

## 2024-12-27 RX ADMIN — FENTANYL CITRATE 72 MCG: 50 INJECTION, SOLUTION INTRAMUSCULAR; INTRAVENOUS at 12:30

## 2024-12-27 RX ADMIN — FENTANYL CITRATE 25 MCG: 50 INJECTION, SOLUTION INTRAMUSCULAR; INTRAVENOUS at 12:09

## 2024-12-27 RX ADMIN — METHOCARBAMOL 1000 MG: 100 INJECTION, SOLUTION INTRAMUSCULAR; INTRAVENOUS at 12:45

## 2024-12-27 RX ADMIN — ACETAMINOPHEN 1000 MG: 500 TABLET ORAL at 10:47

## 2024-12-27 RX ADMIN — HEPARIN SODIUM 5000 UNITS: 5000 INJECTION, SOLUTION INTRAVENOUS; SUBCUTANEOUS at 09:12

## 2024-12-27 RX ADMIN — FENTANYL CITRATE 50 MCG: 50 INJECTION, SOLUTION INTRAMUSCULAR; INTRAVENOUS at 12:45

## 2024-12-27 RX ADMIN — MIDAZOLAM HYDROCHLORIDE 2 MG: 1 INJECTION, SOLUTION INTRAMUSCULAR; INTRAVENOUS at 10:55

## 2024-12-27 ASSESSMENT — PAIN DESCRIPTION - PAIN TYPE
TYPE: SURGICAL PAIN

## 2024-12-27 NOTE — DISCHARGE INSTRUCTIONS
HOME CARE INSTRUCTIONS    ACTIVITY: Rest and take it easy for the first 24 hours.  A responsible adult is recommended to remain with you during that time.  It is normal to feel sleepy.  We encourage you to not do anything that requires balance, judgment or coordination.    FOR 24 HOURS DO NOT:  Drive, operate machinery or run household appliances.  Drink beer or alcoholic beverages.  Make important decisions or sign legal documents.    SPECIAL INSTRUCTIONS: Do not lift anything that is heavier than 10 lb (4.5 kg), or the limit that you are told, until your health care provider says that it is safe.    DIET: To avoid nausea, slowly advance diet as tolerated, avoiding spicy or greasy foods for the first day.  Add more substantial food to your diet according to your physician's instructions.  Babies can be fed formula or breast milk as soon as they are hungry.  INCREASE FLUIDS AND FIBER TO AVOID CONSTIPATION.    SURGICAL DRESSING/BATHING: Do not pick or remove surgical glue. This will fall on its own. Keep area clean and dry. Do not scrub. No baths, hot tubs, or swimming until cleared by your physician.     MEDICATIONS: Resume taking daily medication.  Take prescribed pain medication with food.  If no medication is prescribed, you may take non-aspirin pain medication if needed.  PAIN MEDICATION CAN BE VERY CONSTIPATING.  Take a stool softener or laxative such as senokot, pericolace, or milk of magnesia if needed.    Prescription given for oxyCODONE-acetaminophen (PERCOCET) 5-325 MG Tab       methocarbamol 500 MG  naproxen 500 MG   polyethylene glycol/lytes Pack.    Last pain medication given: Oxycodone (Roxicodone) 10 mg at 12:38PM, Acetaminophen (Tylenol) 1000mg given at 10:47 AM  Methocarbamol (Robaxin) 1000mg IV given at 1245    A follow-up appointment should be arranged with your doctor in  [phone] ; call to schedule.    You should CALL YOUR PHYSICIAN if you develop:  Fever greater than 101 degrees F.  Pain  not relieved by medication, or persistent nausea or vomiting.  Excessive bleeding (blood soaking through dressing) or unexpected drainage from the wound.  Extreme redness or swelling around the incision site, drainage of pus or foul smelling drainage.  Inability to urinate or empty your bladder within 8 hours.  Problems with breathing or chest pain.    You should call 911 if you develop problems with breathing or chest pain.  If you are unable to contact your doctor or surgical center, you should go to the nearest emergency room or urgent care center.  Physician's telephone #:  [phone]     MILD FLU-LIKE SYMPTOMS ARE NORMAL.  YOU MAY EXPERIENCE GENERALIZED MUSCLE ACHES, THROAT IRRITATION, HEADACHE AND/OR SOME NAUSEA.    If any questions arise, call your doctor.  If your doctor is not available, please feel free to call the Surgical Center at [phone].  The Center is open Monday through Friday from 7AM to 7PM.      A registered nurse may call you a few days after your surgery to see how you are doing after your procedure.    You may also receive a survey in the mail within the next two weeks and we ask that you take a few moments to complete the survey and return it to us.  Our goal is to provide you with very good care and we value your comments.     Depression / Suicide Risk    As you are discharged from this [facility] facility, it is important to learn how to keep safe from harming yourself.    Recognize the warning signs:  Abrupt changes in personality, positive or negative- including increase in energy   Giving away possessions  Change in eating patterns- significant weight changes-  positive or negative  Change in sleeping patterns- unable to sleep or sleeping all the time   Unwillingness or inability to communicate  Depression  Unusual sadness, discouragement and loneliness  Talk of wanting to die  Neglect of personal appearance   Rebelliousness- reckless behavior  Withdrawal from  people/activities they love  Confusion- inability to concentrate     If you or a loved one observes any of these behaviors or has concerns about self-harm, here's what you can do:  Talk about it- your feelings and reasons for harming yourself  Remove any means that you might use to hurt yourself (examples: pills, rope, extension cords, firearm)  Get professional help from the community (Mental Health, Substance Abuse, psychological counseling)  Do not be alone:Call your Safe Contact- someone whom you trust who will be there for you.  Call your local CRISIS HOTLINE 940-2472 or 204-920-2662  Call your local Children's Mobile Crisis Response Team [place] (079) 852-1834 or www.[employer]  Call the toll free National Suicide Prevention Hotlines   National Suicide Prevention Lifeline 056-674-NZCY (9873)  National Hope Line Network 800-SUICIDE (243-9337)    I acknowledge receipt and understanding of these Home Care instructions.

## 2024-12-27 NOTE — OP REPORT
DEPARTMENT OF SURGERY    OPERATIVE REPORT        DATE OF OPERATION: 12/27/2024      SURGEON: Pineda Fox MD MPH FACS [facility]      ASSISTANT(S): None     ANESTHESIOLOGIST(S): Binu Spence MD       ANESTHESIA: General with local anesthetic      PREOPERATIVE DIAGNOSIS: Symptomatic umbilical hernia      POSTOPERATIVE DIAGNOSIS: Symptomatic umbilical hernia (4 cm)      OPERATION(S) PERFORMED: Robotic umbilical hernia repair with mesh (IPOM)      ESTIMATED BLOOD LOSS: 20 ml      SPECIMEN(S): None      COMPLICATIONS: None      BACKGROUND: The patient is a 64 y.o. male with a symptomatic umbilical hernia, associated with a bulge and pain. Mr. Sujit Sanchez is scheduled to undergo a robotic vs laparoscopic, possible open, umbilical hernia repair with mesh. The procedure and its associated risks, benefits, and alternatives were discussed with the patient. All his questions were answered and he agreed to proceed with the operation.      OPERATIVE FINDINGS: During this procedure, a small umbilical hernia was identified, measuring ~ 4 cm in diameter and containing a substantial amount of incarcerated omental fat. There was no evidence of any other anterior abdominal wall hernias elsewhere. Robotic IPOM repair was performed in a standard fashion, with mesh reinforcement.     OPERATIVE PROCEDURE: The patient was brought into the operating room (OR) and positioned on the operating table in the supine position. Care was taken to make sure that all the extremities and bony prominences were adequately padded. Intermittent compression devices were placed on bilateral lower extremities. The patient was given Ancef 3 g IV prior to induction of anesthesia. After successful initiation of general endotracheal anesthesia, patient’s abdominal wall was shaved, and then prepped and draped in the usual sterile fashion. A time out was completed verifying correct patient, procedure, site, positioning, and implant prior  to beginning of the operation.      A small skin incision was created in the left upper quadrant (in the Rios's point) and a Veress needle was inserted without incident. The saline drip test was performed and was negative. The pneumoperitoneum was created with CO2 to 15 mmHg pressure without any physiologic derangements. The abdomen was then entered by using the [employer] First Entry 5-mm trocar and a 5-mm, 0-degree scope in the same location, without incident. No injuries were noted from initial or subsequent trocar placement. Three 8-mm robotic trocars were placed laterally to the rectus muscle along the patient's left lateral abdomen. Diagnostic laparoscopy demonstrated a small umbilical hernia, measuring ~ 4 cm in diameter and containing a substantial amount of incarcerated pre-peritoneal fat. There was no evidence of any other anterior abdominal wall hernias elsewhere. The [employer] robotic system was then successfully docked to the patient's left side and the remainder of this operation was performed with assistance of the robot.     The umbilical hernia defect was identified and its contents were reduced with gentle traction after all adhesions to the hernia sac were taken down with robotic monopolar scissors. The defect was closed with a running 0-PDS (Stratafix) suture in two layers. It was then reinforced with a piece of synthetic coated intraperitoneal mesh (12 x 12 cm, by [employer]). It was rolled longitudinally into a compact cylinder, and then passed through the trocar. The mesh was unrolled into place to completely cover the defect and provide adequate overlap on each side. Next, it was secured into position by using four running 3-0 PDS (Stratafix) sutures around its periphery.       After ensuring adequate hemostasis using electrocautery, the insufflation was stopped and the robotic system was undocked. All trocars were removed and their skin incisions were closed using 4-0 Monocryl sutures.  Dermabond was applied over each incision. The patient was then awakened from anesthesia and transferred to the recovery room in stable condition. At the conclusion of the case, the sponge, needle, and instrument counts were correct x 2.      Dr. Fox was present and scrubbed throughout the entirety of this case.

## 2024-12-27 NOTE — ANESTHESIA TIME REPORT
Anesthesia Start and Stop Event Times       Date Time Event    12/27/2024 1040 Ready for Procedure     1053 Anesthesia Start     1238 Anesthesia Stop          Responsible Staff  12/27/24      Name Role Begin End    Binu Spence M.D. Anesth 1053 1238          Overtime Reason:  no overtime (within assigned shift)    Comments:

## 2024-12-27 NOTE — PROGRESS NOTES
Medication history reviewed with PT at bedside    [facility] rec is complete per PT reporting    Allergies reviewed.     Patient denies any outpatient antibiotics in the last 30 days.     Patient is taking Xarelto 20mg. Last dose was 12/26/2024 in the AM    Preferred pharmacy for this visit - [facility] in [place] ([phone])

## 2024-12-27 NOTE — ANESTHESIA PROCEDURE NOTES
Airway    Date/Time: 12/27/2024 11:00 AM    Performed by: Binu Spence M.D.  Authorized by: Binu Spence M.D.    Location:  OR  Urgency:  Elective  Indications for Airway Management:  Anesthesia      Spontaneous Ventilation: absent    Sedation Level:  Deep  Preoxygenated: Yes    Patient Position:  Sniffing  Mask Difficulty Assessment:  1 - vent by mask  Final Airway Type:  Endotracheal airway  Final Endotracheal Airway:  ETT  Cuffed: Yes    Technique Used for Successful ETT Placement:  Video laryngoscopy  Devices/Methods Used in Placement:  Intubating stylet    Insertion Site:  Oral  Blade Type:  Glide  Laryngoscope Blade/Videolaryngoscope Blade Size:  3  ETT Size (mm):  8.0  Measured from:  Teeth  ETT to Teeth (cm):  24  Placement Verified by: auscultation and capnometry    Cormack-Lehane Classification:  Grade I - full view of glottis  Number of Attempts at Approach:  1

## 2024-12-27 NOTE — OR NURSING
Patient arrived in PACU, VSS. Abdominal incisions for hernia repair x4 closed with dermabond, umbilicus location covered with guaze and tegaderm, CDI.   Medicated patient for pain per MAR.   Report called to HOWIE Yee. Transported to phase 2 on room air.

## 2024-12-27 NOTE — ANESTHESIA PREPROCEDURE EVALUATION
Case: [id number] Date/Time: 12/27/24 1015    Procedure: ROBOTIC UMBILICAL HERNIA REPAIR WITH MESH    Pre-op diagnosis: UMBILICAL HERNIA W/O OBSTRUCTION    Location: [facility] OR 15 / SURGERY [facility]    Surgeons: Pineda Fox M.D.          Pre DM  Hx prostate ca  Bmi 35.8  Relevant Problems   NEURO   (positive) TIA (transient ischemic attack)      CARDIAC   (positive) Chronic deep vein thrombosis (DVT) of lower extremity (HCC)   (positive) Essential hypertension       Physical Exam    Airway   Mallampati: III  TM distance: >3 FB  Neck ROM: full       Cardiovascular - normal exam  Rhythm: regular  Rate: normal  (-) murmur     Dental - normal exam           Pulmonary - normal exam  Breath sounds clear to auscultation     Abdominal    Neurological - normal exam                   Anesthesia Plan    ASA 3   ASA physical status 3 criteria: CVA or TIA - history (> 3 months)    Plan - general               Induction: intravenous    Postoperative Plan: Postoperative administration of opioids is intended.    Pertinent diagnostic labs and testing reviewed    Informed Consent:    Anesthetic plan and risks discussed with patient.    Use of blood products discussed with: patient whom consented to blood products.

## 2024-12-27 NOTE — OR NURSING
Patient arrived to unit at 1401. Patient is AOx4. Transferred to chair appropriately. Patient's pain is 4/10. Declines pain medication at this time. Patient's dressings to abdomen are CDI.Abdominal binder and QueseEase pod applied for comfort.Tolerating liquids at this time. Discharge instructions discussed with the patient/spouse. Patient denies any further questions at this time. Patient discharged home to responsible adult at 1555.

## 2024-12-28 NOTE — ANESTHESIA POSTPROCEDURE EVALUATION
Patient: Sujit Sanchez    Procedure Summary       Date: 12/27/24 Room / Location: [place] [address] / SURGERY [facility]    Anesthesia Start: 1053 Anesthesia Stop: 1238    Procedure: ROBOTIC UMBILICAL HERNIA REPAIR WITH MESH (Abdomen) Diagnosis: (UMBILICAL HERNIA W/O OBSTRUCTION)    Surgeons: Pineda Fox M.D. Responsible Provider: Binu Spence M.D.    Anesthesia Type: general ASA Status: 3            Final Anesthesia Type: general  Last vitals  BP   Blood Pressure : 121/58    Temp   35.9 °C (96.6 °F)    Pulse   86   Resp   17    SpO2   92 %      Anesthesia Post Evaluation    Patient location during evaluation: PACU  Patient participation: complete - patient participated  Level of consciousness: awake and alert    Airway patency: patent  Anesthetic complications: no  Cardiovascular status: hemodynamically stable  Respiratory status: acceptable  Hydration status: euvolemic    PONV: none          No notable events documented.     Nurse Pain Score: 7 (NPRS)

## 2025-01-08 ENCOUNTER — OFFICE VISIT (OUTPATIENT)
Dept: SURGICAL ONCOLOGY | Facility: MEDICAL CENTER | Age: 65
End: 2025-01-08
Payer: MEDICARE

## 2025-01-08 VITALS
OXYGEN SATURATION: 95 % | WEIGHT: 261 LBS | BODY MASS INDEX: 33.5 KG/M2 | TEMPERATURE: 97.3 F | HEART RATE: 69 BPM | HEIGHT: 74 IN

## 2025-01-08 DIAGNOSIS — Z87.19 S/P HERNIA REPAIR: ICD-10-CM

## 2025-01-08 DIAGNOSIS — Z98.890 S/P HERNIA REPAIR: ICD-10-CM

## 2025-01-08 PROCEDURE — 99213 OFFICE O/P EST LOW 20 MIN: CPT | Performed by: SURGERY

## 2025-01-08 NOTE — PROGRESS NOTES
DEPARTMENT OF SURGERY  BARIATRIC AND GASTROESOPHAGEAL SURGERY      DATE OF SERVICE:  1/8/2025    REASON FOR VISIT:  Postoperative Evaluation    HISTORY OF PRESENT ILLNESS:  Mr. Sanchez is recovering well, overall, ~ 2 weeks after his robotic umbilical hernia repair with mesh (IPOM). He is experiencing the following symptoms: abdominal/incisional pain in LUQ, LLQ, and at the umbilicus, as well as, sharp bilateral flank pain (occasionally). Of note, these symptoms are slowly improving. Otherwise, no recent history of fevers/chills, jaundice/icterus, heartburn/reflux, regurgitation, dysphagia/odynophagia, nausea/vomiting, hematemesis, bloating/distention, worsening abdominal pain, dysuria/hematuria, BRBPR/melena, or diarrhea. All abdominal incisions are healing well, without any surrounding redness, swelling, or drainage. No other symptoms or complaints at this time. Readmissions, interventions, or reoperations since primary operation: NO.    Past Medical History:   Diagnosis Date    Blister of foot or toe, infected 09/23/2015    Cancer (HCC) 2011    prostate cancer    Diabetes (HCC)     prediabetic    High cholesterol     on medication    Hyperlipidemia     Hypertension     Personal history of venous thrombosis and embolism 2000, 2005    leg    Prostate cancer (HCC) 05/16/2013    Sees Dr Washington  Treated with external beam radiation IMO load March 2020    Stroke (HCC)     TIA 2019, mild facia droop on left and numbness to fingers on the left    Umbilicus discharge 09/23/2015    Unspecified hemorrhagic conditions     due to coumadin     Past Surgical History:   Procedure Laterality Date    REPAIR, HERNIA, UMBILICAL, ROBOT-ASSISTED, USING [employer] [facility]  12/27/2024    Procedure: ROBOTIC UMBILICAL HERNIA REPAIR WITH MESH;  Surgeon: Pineda Fox M.D.;  Location: SURGERY [facility];  Service: Gen Robotic    MS COLONOSCOPY,DIAGNOSTIC N/A 7/28/2020    Procedure: COLONOSCOPY;  Surgeon: Blayne Guzman M.D.;  Location:  SURGERY SAME DAY [facility];  Service: Gastroenterology    GA UPPER GI ENDOSCOPY,BIOPSY N/A 7/28/2020    Procedure: GASTROSCOPY, WITH BIOPSY;  Surgeon: Blayne Guzman M.D.;  Location: SURGERY SAME DAY [facility];  Service: Gastroenterology    COLONOSCOPY WITH APC N/A 8/20/2018    Procedure: COLONOSCOPY WITH APC;  Surgeon: Maurilio Escoto M.D.;  Location: ENDOSCOPY [facility];  Service: Gastroenterology    COLONOSCOPY  3/2011    Mild diverticulosis;hemorrhoids   Dr Escoto    KNEE ARTHROSCOPY  10/25/2010    Right knee  Performed by RADHA RAJAN at SURGERY [facility]    MENISCECTOMY, KNEE, MEDIAL  10/25/2010    Right knee Performed by RADHA RAJAN at SURGERY [facility]    SYNOVECTOMY  10/25/2010    Right knee Performed by RADHA RAJAN at [facility]    ORIF, ANKLE  2005    left ankle    ORIF, FRACTURE, FEMUR  2005    left femur     Allergies   Allergen Reactions    Oxycodone-Ibuprofen Rash     On Back       Enalapril Cough       Current Outpatient Medications   Medication Sig Dispense Refill    methocarbamol (ROBAXIN) 500 MG Tab Take 2 Tablets by mouth 3 times a day as needed (Abdominal pain). 180 Tablet 1    naproxen (NAPROSYN) 500 MG Tab Take 1 Tablet by mouth 2 times daily with meals as needed (Abdominal pain) for up to 30 days. 60 Tablet 1    polyethylene glycol/lytes (MIRALAX) Pack Take 1 Packet by mouth 1 time a day as needed (Constipation) for up to 14 days. 14 Each 0    methocarbamol (ROBAXIN) 500 MG Tab Take 2 Tablets by mouth 3 times a day as needed (Abdominal pain). 90 Tablet 1    naproxen (NAPROSYN) 500 MG Tab Take 1 Tablet by mouth 2 times daily with meals as needed (Abdominal pain) for up to 30 days. 60 Tablet 1    polyethylene glycol/lytes (MIRALAX) Pack Mix 1 Packet per package instructions and drink by mouth 1 time a day as needed (Constipation) for up to 14 days. 14 Each 0    atorvastatin (LIPITOR) 40 MG Tab Take 1 Tablet by mouth at bedtime. 100 Tablet  "1    amLODIPine (NORVASC) 5 MG Tab Take 1 Tablet by mouth every day. 90 Tablet 3    rivaroxaban (XARELTO) 20 MG Tab tablet Take 1 Tablet by mouth with dinner. 30 Tablet 6    vitamin D3 (CHOLECALCIFEROL) 1000 Unit (25 mcg) Tab Take 1 Tablet by mouth every day.      MAGNESIUM PO Take 1 Tablet by mouth every day.      therapeutic multivitamin-minerals (THERAGRAN-M) Tab Take 1 Tablet by mouth every day.       No current facility-administered medications for this visit.     PHYSICAL EXAM:    Pulse 69   Temp 36.3 °C (97.3 °F) (Temporal)   Ht 1.88 m (6' 2\")   Wt 118 kg (261 lb)   SpO2 95%   BMI 33.51 kg/m²     Body mass index is 33.51 kg/m².    General: healthy, alert, oriented, no distress, relaxed, cooperative  Abdomen: soft, with minimal tenderness in LUQ, LLQ, and at umbilicus, no distention, no rebound/guarding, no palpable bulges or masses, + healing ridge  Incisions: clean, dry and intact; healing well, no evidence of infection or bulges  Drains/Tubes: none  Extremities: warm and well-perfused, without clubbing, cyanosis or edema bilaterally  Skin: no rashes, no ecchymoses, no petechiae, no jaundice, no open wounds  Psych: good judgement, mood and affect are appropriate, excellent hygiene    ASSESSMENT:  Mr. Sanchez returns to our clinic today for a 2 week postoperative visit after undergoing a robotic umbilical hernia repair with mesh. He is recovering well in the early postoperative setting. We had a lengthy discussion re: details of his procedure, findings at surgery, and expected postoperative recovery process. The patient expresses satisfaction with the results of his operation, thus far. All questions were answered in full and copies of the Operative report (and Pathology report, if applicable) were provided to Mr. Sanchez for further reference.    Dietary progression, lifting restrictions, and activity recommendations were discussed, as well as, planned return to work and/or school.    PLAN:  1.  The " following issues were addressed during this visit:  A. Continued pain management, including minimizing opioid-based medications and relying on Tylenol and NSAID's for baseline pain control  B. Appropriate dietary modifications and prophylactic management of constipation with increased fiber and water intake  C. Adding Miralax PRN if constipation doesn't resolve with the above-mentioned strategies  D. Avoidance of heavy lifting (> 10-15 lbs) or strenuous physical activity for a month after surgery    2.  Patient has been instructed to return to our clinic PRN new questions/concerns.     Thank you for giving us the opportunity to care for your patient.      Sincerely,    Pineda Fox MD MPH FACS [facility]  Bariatric and Gastroesophageal Surgery  Department of Surgery, [facility]  Clinical Assistant Professor of Surgery  [employer]    1/8/2025

## 2025-01-20 ENCOUNTER — APPOINTMENT (OUTPATIENT)
Dept: MEDICAL GROUP | Facility: PHYSICIAN GROUP | Age: 65
End: 2025-01-20
Payer: MEDICARE

## 2025-01-20 VITALS
DIASTOLIC BLOOD PRESSURE: 78 MMHG | OXYGEN SATURATION: 96 % | BODY MASS INDEX: 34 KG/M2 | WEIGHT: 264.96 LBS | SYSTOLIC BLOOD PRESSURE: 126 MMHG | TEMPERATURE: 97.8 F | HEART RATE: 69 BPM | HEIGHT: 74 IN

## 2025-01-20 DIAGNOSIS — R73.03 PREDIABETES: Chronic | ICD-10-CM

## 2025-01-20 DIAGNOSIS — I82.5Y2 CHRONIC DEEP VEIN THROMBOSIS (DVT) OF PROXIMAL VEIN OF LEFT LOWER EXTREMITY (HCC): Chronic | ICD-10-CM

## 2025-01-20 DIAGNOSIS — E78.5 DYSLIPIDEMIA: Chronic | ICD-10-CM

## 2025-01-20 DIAGNOSIS — Z85.46 HISTORY OF PROSTATE CANCER: ICD-10-CM

## 2025-01-20 DIAGNOSIS — I10 ESSENTIAL HYPERTENSION: Chronic | ICD-10-CM

## 2025-01-20 DIAGNOSIS — E55.9 VITAMIN D DEFICIENCY DISEASE: Chronic | ICD-10-CM

## 2025-01-20 DIAGNOSIS — K42.9 UMBILICAL HERNIA WITHOUT OBSTRUCTION AND WITHOUT GANGRENE: Chronic | ICD-10-CM

## 2025-01-20 PROBLEM — E66.9 OBESITY: Chronic | Status: ACTIVE | Noted: 2023-01-17

## 2025-01-20 PROCEDURE — 3078F DIAST BP <80 MM HG: CPT | Performed by: INTERNAL MEDICINE

## 2025-01-20 PROCEDURE — 3074F SYST BP LT 130 MM HG: CPT | Performed by: INTERNAL MEDICINE

## 2025-01-20 PROCEDURE — 99214 OFFICE O/P EST MOD 30 MIN: CPT | Performed by: INTERNAL MEDICINE

## 2025-01-20 ASSESSMENT — PATIENT HEALTH QUESTIONNAIRE - PHQ9: CLINICAL INTERPRETATION OF PHQ2 SCORE: 0

## 2025-01-20 NOTE — ASSESSMENT & PLAN NOTE
Chronic condition.  Patient is taking amlodipine 5 mg daily.  Blood pressure has been well-controlled.  No significant side effects reported.

## 2025-01-20 NOTE — ASSESSMENT & PLAN NOTE
Chronic condition.  The patient is taking vitamin D supplementation.  No new symptoms reported.  Previous lab test result discussed with the patient

## 2025-01-20 NOTE — ASSESSMENT & PLAN NOTE
This is a chronic condition.  Patient being treated with atorvastatin.  Lipid panel result discussed with the patient.  Patient denies chest pain shortness of breath or palpitation

## 2025-01-20 NOTE — ASSESSMENT & PLAN NOTE
Chronic condition.  Patient status post XRT 2008.  Patient no longer sees urology  Patient denies dysuria or hematuria.     Latest Reference Range & Units 10/18/23 06:39 11/09/24 07:25   Prostatic Specific Antigen Tot 0.00 - 4.00 ng/mL 0.43 0.45

## 2025-01-20 NOTE — ASSESSMENT & PLAN NOTE
This is a new condition condition.  Lab test result discussed with the patient.  A1c slightly high at 5.7%.  Recommend diet and lifestyle modification.  Patient reported family history of diabetes

## 2025-01-20 NOTE — ASSESSMENT & PLAN NOTE
Chronic condition.  Status post surgery December 27, 2024.  Patient has done well postoperatively.  The patient was seen by surgeon for follow-up and has been released to go back to work no new symptoms reported.

## 2025-01-20 NOTE — PROGRESS NOTES
PRIMARY CARE CLINIC VISIT        Chief Complaint   Patient presents with    Follow-Up     Medication follow up       Follow-up hypertension  Chronic DVT  History prostate cancer  Umbilical hernia status post surgery  Hyperlipidemia  Prediabetes  Vitamin D deficiency  Lab test results        History of Present Illness     Chronic deep vein thrombosis (DVT) of lower extremity (HCC)  This is a chronic condition.  The patient presently taking Xarelto.  Patient tolerating medication well denied history of bleeding.    History of prostate cancer  Chronic condition.  Patient status post XRT 2008.  Patient no longer sees urology  Patient denies dysuria or hematuria.     Latest Reference Range & Units 10/18/23 06:39 11/09/24 07:25   Prostatic Specific Antigen Tot 0.00 - 4.00 ng/mL 0.43 0.45       Essential hypertension  Chronic condition.  Patient is taking amlodipine 5 mg daily.  Blood pressure has been well-controlled.  No significant side effects reported.    Vitamin D deficiency disease  Chronic condition.  The patient is taking vitamin D supplementation.  No new symptoms reported.  Previous lab test result discussed with the patient    Dyslipidemia  This is a chronic condition.  Patient being treated with atorvastatin.  Lipid panel result discussed with the patient.  Patient denies chest pain shortness of breath or palpitation    Prediabetes  This is a new condition condition.  Lab test result discussed with the patient.  A1c slightly high at 5.7%.  Recommend diet and lifestyle modification.  Patient reported family history of diabetes    Umbilical hernia  Chronic condition.  Status post surgery December 27, 2024.  Patient has done well postoperatively.  The patient was seen by surgeon for follow-up and has been released to go back to work no new symptoms reported.    Current Outpatient Medications on File Prior to Visit   Medication Sig Dispense Refill    atorvastatin (LIPITOR) 40 MG Tab Take 1 Tablet by mouth at  bedtime. 100 Tablet 1    amLODIPine (NORVASC) 5 MG Tab Take 1 Tablet by mouth every day. 90 Tablet 3    rivaroxaban (XARELTO) 20 MG Tab tablet Take 1 Tablet by mouth with dinner. 30 Tablet 6    vitamin D3 (CHOLECALCIFEROL) 1000 Unit (25 mcg) Tab Take 1 Tablet by mouth every day.      therapeutic multivitamin-minerals (THERAGRAN-M) Tab Take 1 Tablet by mouth every day.      MAGNESIUM PO Take 1 Tablet by mouth every day.       No current facility-administered medications on file prior to visit.        Allergies: Oxycodone-ibuprofen and Enalapril    Current Outpatient Medications Ordered in Epic   Medication Sig Dispense Refill    atorvastatin (LIPITOR) 40 MG Tab Take 1 Tablet by mouth at bedtime. 100 Tablet 1    amLODIPine (NORVASC) 5 MG Tab Take 1 Tablet by mouth every day. 90 Tablet 3    rivaroxaban (XARELTO) 20 MG Tab tablet Take 1 Tablet by mouth with dinner. 30 Tablet 6    vitamin D3 (CHOLECALCIFEROL) 1000 Unit (25 mcg) Tab Take 1 Tablet by mouth every day.      therapeutic multivitamin-minerals (THERAGRAN-M) Tab Take 1 Tablet by mouth every day.      MAGNESIUM PO Take 1 Tablet by mouth every day.       No current [facility]-ordered facility-administered medications on file.       Past Medical History:   Diagnosis Date    Blister of foot or toe, infected 09/23/2015    Cancer (HCC) 2011    prostate cancer    Diabetes (HCC)     prediabetic    High cholesterol     on medication    Hyperlipidemia     Hypertension     Personal history of venous thrombosis and embolism 2000, 2005    leg    Prostate cancer (HCC) 05/16/2013    Sees Dr Washington  Treated with external beam radiation IMO load March 2020    Stroke (HCC)     TIA 2019, mild facia droop on left and numbness to fingers on the left    Umbilicus discharge 09/23/2015    Unspecified hemorrhagic conditions     due to coumadin       Past Surgical History:   Procedure Laterality Date    REPAIR, HERNIA, UMBILICAL, ROBOT-ASSISTED, USING [employer]  12/27/2024    Procedure: ROBOTIC  UMBILICAL HERNIA REPAIR WITH MESH;  Surgeon: Pineda Fox M.D.;  Location: SURGERY [facility];  Service: Gen Robotic    [place] COLONOSCOPY,DIAGNOSTIC N/A 7/28/2020    Procedure: COLONOSCOPY;  Surgeon: Blayne Guzman M.D.;  Location: SURGERY SAME DAY [facility];  Service: Gastroenterology    [place] UPPER GI ENDOSCOPY,BIOPSY N/A 7/28/2020    Procedure: GASTROSCOPY, WITH BIOPSY;  Surgeon: Blayne Guzman M.D.;  Location: SURGERY SAME DAY [facility];  Service: Gastroenterology    COLONOSCOPY WITH APC N/A 8/20/2018    Procedure: COLONOSCOPY WITH APC;  Surgeon: Maurilio Escoto M.D.;  Location: ENDOSCOPY [facility];  Service: Gastroenterology    COLONOSCOPY  3/2011    Mild diverticulosis;hemorrhoids   Dr Escoto    KNEE ARTHROSCOPY  10/25/2010    Right knee  Performed by RADHA RAJAN at [facility]    MENISCECTOMY, KNEE, MEDIAL  10/25/2010    Right knee Performed by RADHA RAJAN at [facility]    SYNOVECTOMY  10/25/2010    Right knee Performed by RADHA RAJAN at [facility]    ORIF, ANKLE  2005    left ankle    ORIF, FRACTURE, FEMUR  2005    left femur       Family History   Problem Relation Age of Onset    Lung Disease Mother         emphysema    Cancer Father         lung    Lung Disease Father     Diabetes Other     Heart Disease Other     Diabetes Sister     No Known Problems Brother     No Known Problems Sister     No Known Problems Daughter     No Known Problems Daughter        Social History     Tobacco Use   Smoking Status Never    Passive exposure: Past   Smokeless Tobacco Never       Social History     Substance and Sexual Activity   Alcohol Use Yes    Alcohol/week: 1.2 oz    Types: 2 Cans of beer per week    Comment: drinks beer on weekends, 1-2 beers on the weekdays       Review of systems  As per HPI above. All other systems reviewed and negative.      Past Medical, Social, and Family history reviewed and updated in [facility]       LAB DATA:     I have  "independently reviewed / interpreted labs    Lab Results   Component Value Date/Time    HBA1C 5.7 (H) 11/09/2024 07:25 AM    HBA1C 5.5 10/18/2023 06:39 AM    HBA1C 5.5 03/22/2022 06:20 AM        Lab Results   Component Value Date/Time    WBC 6.1 11/09/2024 07:25 AM    HEMOGLOBIN 16.0 11/09/2024 07:25 AM    HEMATOCRIT 47.1 11/09/2024 07:25 AM    MCV 96.9 11/09/2024 07:25 AM    PLATELETCT 224 11/09/2024 07:25 AM       Lab Results   Component Value Date/Time    SODIUM 141 11/09/2024 07:25 AM    POTASSIUM 4.6 11/09/2024 07:25 AM    GLUCOSE 86 11/09/2024 07:25 AM    BUN 16 11/09/2024 07:25 AM    CREATININE 0.78 11/09/2024 07:25 AM       Lab Results   Component Value Date/Time    CHOLSTRLTOT 129 11/09/2024 07:25 AM    TRIGLYCERIDE 95 11/09/2024 07:25 AM    HDL 48 11/09/2024 07:25 AM    LDL 62 11/09/2024 07:25 AM       Lab Results   Component Value Date/Time    ALTSGPT 51 (H) 11/09/2024 07:25 AM          Objective     /78 (BP Location: Left arm, Patient Position: Sitting, BP Cuff Size: Adult)   Pulse 69   Temp 36.6 °C (97.8 °F) (Temporal)   Ht 1.88 m (6' 2\")   Wt 120 kg (264 lb 15.4 oz)   SpO2 96%    Body mass index is 34.02 kg/m².    General: alert in no apparent distress.  Cardiovascular: regular rate and rhythm  Pulmonary: lungs : no wheezing   Gastrointestinal: BS present.  Well-healed scar from recent surgery.  No mass or discharge.  No obvious signs of infection      Assessment and Plan     1. Chronic deep vein thrombosis (DVT) of proximal vein of left lower extremity (HCC)  Chronic stable condition.  Continue Xarelto 20 Mg daily.  inDefinitely.  Monitor for signs of bleeding  - CBC WITH DIFFERENTIAL; Future    2. History of prostate cancer  Chronic condition.  Status post XRT.  Previous PSA essentially unchanged from prior.  Continue to monitor PSA at least yearly.  The patient declined referral to go back to urologist.    3. Essential hypertension  - MICROALBUMIN CREAT RATIO URINE; Future  Chronic " condition.  Stable.  Continue amlodipine 5 mg daily    4. Umbilical hernia without obstruction and without gangrene  Patient status post surgery December 2024.  The patient has done well postoperatively.  No obvious signs of infection noted.  As above surgeon has released the patient to go back to work    5. Dyslipidemia  - ALANINE AMINO-TRANS; Future  - Lipid Profile; Future  Chronic condition.  Stable.  Continue atorvastatin 40 Mg daily    6. Prediabetes  - HEMOGLOBIN A1C; Future  - Basic Metabolic Panel; Future  This is a new condition.  A1c 5.7%.  Recommend diet and lifestyle modification.  Repeat lab test next visit in 6 months    7. Vitamin D deficiency disease  - VITAMIN D,25 HYDROXY (DEFICIENCY); Future  Chronic stable.  Continue vitamin D supplementation 1000 unit daily.                  Please note that this dictation was created using voice recognition software. I have made every reasonable attempt to correct obvious errors, but I expect that there are errors of grammar and possibly content that I did not discover before finalizing the note.    Shaji Fisher MD  Internal Medicine  [facility]

## 2025-06-01 DIAGNOSIS — I82.5Y2 CHRONIC DEEP VEIN THROMBOSIS (DVT) OF PROXIMAL VEIN OF LEFT LOWER EXTREMITY (HCC): Chronic | ICD-10-CM

## 2025-06-03 ENCOUNTER — RESULTS FOLLOW-UP (OUTPATIENT)
Dept: MEDICAL GROUP | Facility: PHYSICIAN GROUP | Age: 65
End: 2025-06-03

## 2025-06-03 ENCOUNTER — HOSPITAL ENCOUNTER (OUTPATIENT)
Dept: LAB | Facility: MEDICAL CENTER | Age: 65
End: 2025-06-03
Attending: INTERNAL MEDICINE
Payer: MEDICARE

## 2025-06-03 DIAGNOSIS — E55.9 VITAMIN D DEFICIENCY DISEASE: Chronic | ICD-10-CM

## 2025-06-03 DIAGNOSIS — E78.5 DYSLIPIDEMIA: Chronic | ICD-10-CM

## 2025-06-03 DIAGNOSIS — R73.03 PREDIABETES: Chronic | ICD-10-CM

## 2025-06-03 DIAGNOSIS — I82.5Y2 CHRONIC DEEP VEIN THROMBOSIS (DVT) OF PROXIMAL VEIN OF LEFT LOWER EXTREMITY (HCC): Chronic | ICD-10-CM

## 2025-06-03 DIAGNOSIS — I10 ESSENTIAL HYPERTENSION: Chronic | ICD-10-CM

## 2025-06-03 LAB
25(OH)D3 SERPL-MCNC: 37 NG/ML (ref 30–100)
ALT SERPL-CCNC: 50 U/L (ref 2–50)
ANION GAP SERPL CALC-SCNC: 11 MMOL/L (ref 7–16)
BASOPHILS # BLD AUTO: 1 % (ref 0–1.8)
BASOPHILS # BLD: 0.07 K/UL (ref 0–0.12)
BUN SERPL-MCNC: 19 MG/DL (ref 8–22)
CALCIUM SERPL-MCNC: 9.7 MG/DL (ref 8.5–10.5)
CHLORIDE SERPL-SCNC: 105 MMOL/L (ref 96–112)
CHOLEST SERPL-MCNC: 123 MG/DL (ref 100–199)
CO2 SERPL-SCNC: 23 MMOL/L (ref 20–33)
CREAT SERPL-MCNC: 0.93 MG/DL (ref 0.5–1.4)
CREAT UR-MCNC: 165 MG/DL
EOSINOPHIL # BLD AUTO: 0.15 K/UL (ref 0–0.51)
EOSINOPHIL NFR BLD: 2.2 % (ref 0–6.9)
ERYTHROCYTE [DISTWIDTH] IN BLOOD BY AUTOMATED COUNT: 46.5 FL (ref 35.9–50)
EST. AVERAGE GLUCOSE BLD GHB EST-MCNC: 123 MG/DL
FASTING STATUS PATIENT QL REPORTED: NORMAL
GFR SERPLBLD CREATININE-BSD FMLA CKD-EPI: 90 ML/MIN/1.73 M 2
GLUCOSE SERPL-MCNC: 112 MG/DL (ref 65–99)
HBA1C MFR BLD: 5.9 % (ref 4–5.6)
HCT VFR BLD AUTO: 45.4 % (ref 42–52)
HDLC SERPL-MCNC: 51 MG/DL
HGB BLD-MCNC: 15.4 G/DL (ref 14–18)
IMM GRANULOCYTES # BLD AUTO: 0.02 K/UL (ref 0–0.11)
IMM GRANULOCYTES NFR BLD AUTO: 0.3 % (ref 0–0.9)
LDLC SERPL CALC-MCNC: 56 MG/DL
LYMPHOCYTES # BLD AUTO: 1.61 K/UL (ref 1–4.8)
LYMPHOCYTES NFR BLD: 23.8 % (ref 22–41)
MCH RBC QN AUTO: 33.1 PG (ref 27–33)
MCHC RBC AUTO-ENTMCNC: 33.9 G/DL (ref 32.3–36.5)
MCV RBC AUTO: 97.6 FL (ref 81.4–97.8)
MICROALBUMIN UR-MCNC: 2 MG/DL
MICROALBUMIN/CREAT UR: 12 MG/G (ref 0–30)
MONOCYTES # BLD AUTO: 0.73 K/UL (ref 0–0.85)
MONOCYTES NFR BLD AUTO: 10.8 % (ref 0–13.4)
NEUTROPHILS # BLD AUTO: 4.19 K/UL (ref 1.82–7.42)
NEUTROPHILS NFR BLD: 61.9 % (ref 44–72)
NRBC # BLD AUTO: 0 K/UL
NRBC BLD-RTO: 0 /100 WBC (ref 0–0.2)
PLATELET # BLD AUTO: 213 K/UL (ref 164–446)
PMV BLD AUTO: 10.2 FL (ref 9–12.9)
POTASSIUM SERPL-SCNC: 4.2 MMOL/L (ref 3.6–5.5)
RBC # BLD AUTO: 4.65 M/UL (ref 4.7–6.1)
SODIUM SERPL-SCNC: 139 MMOL/L (ref 135–145)
TRIGL SERPL-MCNC: 81 MG/DL (ref 0–149)
WBC # BLD AUTO: 6.8 K/UL (ref 4.8–10.8)

## 2025-06-03 PROCEDURE — 82043 UR ALBUMIN QUANTITATIVE: CPT

## 2025-06-03 PROCEDURE — 36415 COLL VENOUS BLD VENIPUNCTURE: CPT

## 2025-06-03 PROCEDURE — 83036 HEMOGLOBIN GLYCOSYLATED A1C: CPT

## 2025-06-03 PROCEDURE — 85025 COMPLETE CBC W/AUTO DIFF WBC: CPT

## 2025-06-03 PROCEDURE — 84460 ALANINE AMINO (ALT) (SGPT): CPT

## 2025-06-03 PROCEDURE — 80048 BASIC METABOLIC PNL TOTAL CA: CPT

## 2025-06-03 PROCEDURE — 82570 ASSAY OF URINE CREATININE: CPT

## 2025-06-03 PROCEDURE — 80061 LIPID PANEL: CPT

## 2025-06-03 PROCEDURE — 82306 VITAMIN D 25 HYDROXY: CPT

## 2025-06-03 NOTE — TELEPHONE ENCOUNTER
Received request via: Patient    Was the patient seen in the last year in this department? Yes    Does the patient have an active prescription (recently filled or refills available) for medication(s) requested? No    Pharmacy Name:   [facility]/pharmacy #[id number] - [place], [place] - [address]  [address] [address]  Phone: [phone] Fax: [phone]    Does the patient have MCFP Plus and need 100-day supply? (This applies to ALL medications) Yes, quantity updated to 100 days

## 2025-06-07 DIAGNOSIS — E78.00 PURE HYPERCHOLESTEROLEMIA: ICD-10-CM

## 2025-06-10 RX ORDER — ATORVASTATIN CALCIUM 40 MG/1
40 TABLET, FILM COATED ORAL
Qty: 100 TABLET | Refills: 3 | Status: SHIPPED | OUTPATIENT
Start: 2025-06-10

## (undated) DEVICE — TUBING CLEARLINK DUO-VENT - C-FLO (48EA/CA)

## (undated) DEVICE — SYSTEM CLEARIFY VISUALIZATION (10EA/PK)

## (undated) DEVICE — CHLORAPREP 26 ML APPLICATOR - ORANGE TINT(25/CA)

## (undated) DEVICE — BINDER ABDOMINAL 30X45X12IN - FITS 30-45IN WAIST 12IN HIGH

## (undated) DEVICE — SHEARS MONOPOLAR CURVED DA VINCI 10X'S REUSABLE

## (undated) DEVICE — SPONGE GAUZESTER 4 X 4 4PLY - (128PK/CA)

## (undated) DEVICE — SUTURE 3-0 VICRYL PLUS SH - 27 INCH (36/BX)

## (undated) DEVICE — SUTURE GENERAL

## (undated) DEVICE — SUTURE 3-0 STRATAFIX SPIRAL PDS PLUS SH 15CM (12EA/BX)

## (undated) DEVICE — GLOVE BIOGEL PI INDICATOR SZ 6.5 SURGICAL PF LF - (50/BX 4BX/CA)

## (undated) DEVICE — SEAL UNIVERSAL 5MM-12MM (10EA/BX)

## (undated) DEVICE — CANISTER SUCTION 3000ML MECHANICAL FILTER AUTO SHUTOFF MEDI-VAC NONSTERILE LF DISP (40EA/CA)

## (undated) DEVICE — GOWN WARMING STANDARD FLEX - (30/CA)

## (undated) DEVICE — TROCAR 5X100 NON BLADED Z-TH - READ KII (6/BX)

## (undated) DEVICE — SUTURE2-0 27IN VCRL ANTI VIOL (36PK/BX)

## (undated) DEVICE — BLADE SURGICAL #15 - (50/BX 3BX/CA)

## (undated) DEVICE — DRAPE ARM BOX OF 20

## (undated) DEVICE — DRESSING TRANSPARENT FILM TEGADERM 4 X 4.75" (50EA/BX)"

## (undated) DEVICE — ELECTRODE DUAL RETURN W/ CORD - (50/PK)

## (undated) DEVICE — OBTURATOR BLADELESS STANDARD 8MM (6EA/BX)

## (undated) DEVICE — SENSOR OXIMETER ADULT SPO2 RD SET (20EA/BX)

## (undated) DEVICE — COVER LIGHT HANDLE ALC PLUS DISP (18EA/BX)

## (undated) DEVICE — SET LEADWIRE 5 LEAD BEDSIDE DISPOSABLE ECG (1SET OF 5/EA)

## (undated) DEVICE — GLOVE SZ 6.5 BIOGEL PI MICRO - PF LF (50PR/BX)

## (undated) DEVICE — COVER TIP ENDOWRIST HOT SHEAR - (10EA/BX) DA VINCI

## (undated) DEVICE — Device

## (undated) DEVICE — GLOVE SIZE 6.5 SURGEON ACCELERATOR FREE GREEN (50PR/BX)

## (undated) DEVICE — LACTATED RINGERS INJ 1000 ML - (14EA/CA 60CA/PF)

## (undated) DEVICE — DRAPE COLUMN BOX OF 20

## (undated) DEVICE — GLOVE BIOGEL PI INDICATOR SZ 8.0 SURGICAL PF LF -(50/BX 4BX/CA)

## (undated) DEVICE — SUTURE 4-0 MONOCRYL PLUS PS-1 - 27 INCH (36/BX)

## (undated) DEVICE — DERMABOND ADVANCED - (12EA/BX)

## (undated) DEVICE — BIPOLAR FORCE DA VINCI 12X'S REISABLE

## (undated) DEVICE — SUTURE 0 STRATRFIX SYMMETRIC PDS PLUS 30CM CT-1 (12EA/BX)

## (undated) DEVICE — SLEEVE, VASO, THIGH, MED

## (undated) DEVICE — NEEDLE DRIVER MEGA SUTURECUT DA VINCI 15X'S REUSABLE

## (undated) DEVICE — SET EXTENSION WITH 2 PORTS (48EA/CA) ***PART #2C8610 IS A SUBSTITUTE*****